# Patient Record
Sex: FEMALE | Race: OTHER | NOT HISPANIC OR LATINO | Employment: OTHER | URBAN - METROPOLITAN AREA
[De-identification: names, ages, dates, MRNs, and addresses within clinical notes are randomized per-mention and may not be internally consistent; named-entity substitution may affect disease eponyms.]

---

## 2020-07-20 ENCOUNTER — NEW PATIENT COMPREHENSIVE (OUTPATIENT)
Dept: URBAN - METROPOLITAN AREA CLINIC 46 | Facility: CLINIC | Age: 60
End: 2020-07-20

## 2020-07-20 DIAGNOSIS — H52.7: ICD-10-CM

## 2020-07-20 PROCEDURE — 92004 COMPRE OPH EXAM NEW PT 1/>: CPT

## 2020-07-20 PROCEDURE — 92015 DETERMINE REFRACTIVE STATE: CPT

## 2020-07-20 ASSESSMENT — VISUAL ACUITY
OD_SC: 20/40-1
OD_SC: J2
OS_CC: J10
OS_SC: 20/40-1
OS_CC: 20/30
OD_CC: 20/30
OD_CC: J10
OS_SC: J2

## 2020-07-20 ASSESSMENT — TONOMETRY
OS_IOP_MMHG: 18
OD_IOP_MMHG: 17

## 2020-10-22 NOTE — PATIENT DISCUSSION
"HPI     Annual Exam      Additional comments: DLE 11-17 (godfrey)     needs updated specs & clrx              Blurred Vision      Additional comments: at near only -- distance VA good              Comments     Agree above  Notes slightly reduced at near/ distance with current lens (OS only for   near)  Wears as EW for "sometimes a month"            Last edited by DORIAN Millan, OD on 11/27/2018 12:06 PM. (History)        ROS     Positive for: Eyes    Negative for: Constitutional, Gastrointestinal, Neurological, Skin,   Genitourinary, Musculoskeletal, HENT, Endocrine, Cardiovascular,   Respiratory, Psychiatric, Allergic/Imm, Heme/Lymph    Last edited by DORIAN Millan, OD on 11/27/2018 10:11 AM. (History)        Assessment /Plan     For exam results, see Encounter Report.    Nuclear sclerosis, bilateral    Vitreous floaters, bilateral    Glaucoma screening    Anatomical narrow angle    Astigmatism with presbyopia, bilateral    Contact lens/glasses fitting      1. Early changes, not vis sig for consult   2. RD precautions given  3. Low suspect w/ narrow angles  4. Open 1-2 on VH today--repeat gonio next exam  5. Updated specs rx, gave copy  Also gave copy for distance only to wear with contact lens in place for driving sunglasses    6. Updated clrx with higher power OS  Call if not comfortable  Advised DW>EW for health of cornea  EW not to exceed 6 nights max    EXTENDED WEAR CONTACT LENSES  Continue with Extended Wear of contact lenses, up to maximum nights discussed--6 nights.  Continue with approved contact lens disinfection / rewetting drops as discussed.  Dispose of lenses every 4 weeks as discussed.  Extended wear of contact lenses increases your risk of corneal abrasion, infection, and ulceration.  Stop wearing your lenses and call our office if redness, blurred vision, or pain persists more than 12 hours.  We recommend an annual eye exam for contact lens patients.    Discussed and educated patient on current " Monitor. findings /plan.  RTC 1 year, prn if any changes / issues

## 2021-07-26 ENCOUNTER — PREPPED CHART (OUTPATIENT)
Dept: URBAN - METROPOLITAN AREA CLINIC 46 | Facility: CLINIC | Age: 61
End: 2021-07-26